# Patient Record
Sex: MALE | Race: WHITE | Employment: FULL TIME | URBAN - METROPOLITAN AREA
[De-identification: names, ages, dates, MRNs, and addresses within clinical notes are randomized per-mention and may not be internally consistent; named-entity substitution may affect disease eponyms.]

---

## 2024-02-27 ENCOUNTER — HOSPITAL ENCOUNTER (EMERGENCY)
Facility: HOSPITAL | Age: 49
Discharge: HOME | End: 2024-02-27
Attending: STUDENT IN AN ORGANIZED HEALTH CARE EDUCATION/TRAINING PROGRAM
Payer: COMMERCIAL

## 2024-02-27 ENCOUNTER — APPOINTMENT (OUTPATIENT)
Facility: HOSPITAL | Age: 49
End: 2024-02-27
Attending: STUDENT IN AN ORGANIZED HEALTH CARE EDUCATION/TRAINING PROGRAM
Payer: COMMERCIAL

## 2024-02-27 VITALS
OXYGEN SATURATION: 99 % | BODY MASS INDEX: 41.48 KG/M2 | HEART RATE: 72 BPM | RESPIRATION RATE: 22 BRPM | SYSTOLIC BLOOD PRESSURE: 153 MMHG | WEIGHT: 306.22 LBS | DIASTOLIC BLOOD PRESSURE: 99 MMHG | HEIGHT: 72 IN | TEMPERATURE: 98.2 F

## 2024-02-27 DIAGNOSIS — L03.115 CELLULITIS OF RIGHT LOWER EXTREMITY: Primary | ICD-10-CM

## 2024-02-27 PROCEDURE — 93971 EXTREMITY STUDY: CPT

## 2024-02-27 PROCEDURE — 99284 EMERGENCY DEPT VISIT MOD MDM: CPT

## 2024-02-27 PROCEDURE — 6370000000 HC RX 637 (ALT 250 FOR IP): Performed by: STUDENT IN AN ORGANIZED HEALTH CARE EDUCATION/TRAINING PROGRAM

## 2024-02-27 RX ORDER — CEPHALEXIN 500 MG/1
500 CAPSULE ORAL ONCE
Status: COMPLETED | OUTPATIENT
Start: 2024-02-27 | End: 2024-02-27

## 2024-02-27 RX ORDER — CEPHALEXIN 500 MG/1
500 CAPSULE ORAL 4 TIMES DAILY
Qty: 20 CAPSULE | Refills: 0 | Status: SHIPPED | OUTPATIENT
Start: 2024-02-27 | End: 2024-03-03

## 2024-02-27 RX ADMIN — CEPHALEXIN 500 MG: 500 CAPSULE ORAL at 22:27

## 2024-02-27 ASSESSMENT — PAIN DESCRIPTION - ORIENTATION: ORIENTATION: RIGHT;LOWER

## 2024-02-27 ASSESSMENT — PAIN DESCRIPTION - LOCATION: LOCATION: LEG

## 2024-02-27 ASSESSMENT — PAIN - FUNCTIONAL ASSESSMENT
PAIN_FUNCTIONAL_ASSESSMENT: 0-10
PAIN_FUNCTIONAL_ASSESSMENT: ACTIVITIES ARE NOT PREVENTED

## 2024-02-27 ASSESSMENT — PAIN DESCRIPTION - FREQUENCY: FREQUENCY: CONTINUOUS

## 2024-02-27 ASSESSMENT — PAIN DESCRIPTION - ONSET: ONSET: ON-GOING

## 2024-02-27 ASSESSMENT — PAIN DESCRIPTION - PAIN TYPE: TYPE: ACUTE PAIN

## 2024-02-27 ASSESSMENT — PAIN SCALES - GENERAL: PAINLEVEL_OUTOF10: 6

## 2024-02-27 ASSESSMENT — PAIN DESCRIPTION - DESCRIPTORS: DESCRIPTORS: BURNING;ITCHING

## 2024-02-28 LAB — ECHO BSA: 2.66 M2

## 2024-02-28 NOTE — DISCHARGE INSTRUCTIONS
You have been evaluated in the Emergency Department today for a skin infection. If the area of inflammation was outlined today in the ER, please return to the ER immediately if the area of redness increases beyond that border. Please take your prescribed antibiotics as directed for the full course of the medication.    We recommend you take 600mg ibuprofen every 6 hours or tylenol 650mg every 6 hours as needed for pain. If needed, you can alternate these medications so that you take one medication every 3 hours. For instance, at noon take ibuprofen, then at 3pm take tylenol, then at 6pm take ibuprofen.    Please schedule an appointment for follow up with your primary care physician as soon as possible.    Return to the Emergency Department if you experience recurrent vomiting, fevers greater than 100.4F, increase in area of redness, warmth around the area, foul smelling discharge from the area, increased tenderness around the area, or any other concerning symptoms.    Thank you for choosing us for your care.

## 2024-02-28 NOTE — ED TRIAGE NOTES
Patient ambulatory through triage with complaints of left lower leg pain/redness/swelling. [facility] was [facility] here to rule out blood clot.

## 2024-02-28 NOTE — ED PROVIDER NOTES
[facility] EMERGENCY DEP  EMERGENCY DEPARTMENT ENCOUNTER      Pt Name: Wilfredo Nelson  MRN: [id number]  Birthdate 1975  Date of evaluation: 2/27/2024  Provider: Connie Vallecillo MD    CHIEF COMPLAINT       Chief Complaint   Patient presents with    Leg Pain         HISTORY OF PRESENT ILLNESS    Review of Medical Records: N/A    Nursing Triage Notes were reviewed.    HPI    Wilfredo Nelson is a 48 y.o. male with a history of CAD, HTN who presents to the emergency department for evaluation of RLE swelling and pain x 2 days. 6/10 burning and itching pain in RLE. Redness over right shin new today. Seen at urgent care and referred here for DVT US. General malaise yesterday, he and daughter had stomach bug. Chills on Sunday. Nausea and vomiting on Sunday has since resolved. Denies hemoptysis, immobility, recent surgery, recent travel, personal or family history of VTE.         PAST MEDICAL HISTORY   No past medical history on file.      SURGICAL HISTORY     No past surgical history on file.      CURRENT MEDICATIONS       Discharge Medication List as of 2/27/2024 10:45 PM          ALLERGIES     Patient has no known allergies.    FAMILY HISTORY     No family history on file.       SOCIAL HISTORY       Social History     Socioeconomic History    Marital status:            PHYSICAL EXAM       ED Triage Vitals   BP Temp Temp src Pulse Resp SpO2 Height Weight   -- -- -- -- -- -- -- --       Body mass index is 41.53 kg/m².    Physical Exam    Constitutional: Alert, well-nourished, in no acute distress  Eye: normal conjunctiva, visually fixates and follows  HENT: Normocephalic, nares clear, mucosa moist  Neck: Supple  Respiratory: Respirations non-labored on room air  Cardiovascular: Regular rate and rhythm, radial pulses 2+ and equal  Abdominal: Soft, non-distended, nontender  Musculoskeletal: no deformity or cyanosis  Integumentary: Warm, dry, RLE with erythema, edema, and inflammation over the right shin with